# Patient Record
Sex: MALE
[De-identification: names, ages, dates, MRNs, and addresses within clinical notes are randomized per-mention and may not be internally consistent; named-entity substitution may affect disease eponyms.]

---

## 2017-09-11 ENCOUNTER — HOSPITAL ENCOUNTER (EMERGENCY)
Dept: HOSPITAL 31 - C.ER | Age: 22
LOS: 1 days | Discharge: HOME | End: 2017-09-12
Payer: COMMERCIAL

## 2017-09-11 DIAGNOSIS — B34.9: Primary | ICD-10-CM

## 2017-09-11 DIAGNOSIS — R42: ICD-10-CM

## 2017-09-11 DIAGNOSIS — R11.0: ICD-10-CM

## 2017-09-12 VITALS
RESPIRATION RATE: 16 BRPM | TEMPERATURE: 98 F | DIASTOLIC BLOOD PRESSURE: 71 MMHG | HEART RATE: 80 BPM | SYSTOLIC BLOOD PRESSURE: 107 MMHG | OXYGEN SATURATION: 95 %

## 2017-09-12 LAB
ALBUMIN/GLOB SERPL: 1.7 {RATIO} (ref 1–2.1)
ALP SERPL-CCNC: 79 U/L (ref 38–126)
ALT SERPL-CCNC: 23 U/L (ref 21–72)
AST SERPL-CCNC: 17 U/L (ref 17–59)
BASOPHILS # BLD AUTO: 0 K/UL (ref 0–0.2)
BASOPHILS NFR BLD: 0.5 % (ref 0–2)
BILIRUB SERPL-MCNC: 0.4 MG/DL (ref 0.2–1.3)
BILIRUB UR-MCNC: NEGATIVE MG/DL
BUN SERPL-MCNC: 10 MG/DL (ref 9–20)
CALCIUM SERPL-MCNC: 8.6 MG/DL (ref 8.6–10.4)
CHLORIDE SERPL-SCNC: 97 MMOL/L (ref 98–107)
CO2 SERPL-SCNC: 25 MMOL/L (ref 22–30)
EOSINOPHIL # BLD AUTO: 0.2 K/UL (ref 0–0.7)
EOSINOPHIL NFR BLD: 1.7 % (ref 0–4)
ERYTHROCYTE [DISTWIDTH] IN BLOOD BY AUTOMATED COUNT: 12.6 % (ref 11.5–14.5)
GLOBULIN SER-MCNC: 2.5 GM/DL (ref 2.2–3.9)
GLUCOSE SERPL-MCNC: 84 MG/DL (ref 75–110)
GLUCOSE UR STRIP-MCNC: NORMAL MG/DL
HCT VFR BLD CALC: 42.4 % (ref 35–51)
KETONES UR STRIP-MCNC: NEGATIVE MG/DL
LEUKOCYTE ESTERASE UR-ACNC: (no result) LEU/UL
LYMPHOCYTES # BLD AUTO: 1.7 K/UL (ref 1–4.3)
LYMPHOCYTES NFR BLD AUTO: 17.8 % (ref 20–40)
MCH RBC QN AUTO: 29.2 PG (ref 27–31)
MCHC RBC AUTO-ENTMCNC: 33.5 G/DL (ref 33–37)
MCV RBC AUTO: 87.1 FL (ref 80–94)
MONOCYTES # BLD: 0.6 K/UL (ref 0–0.8)
MONOCYTES NFR BLD: 6.4 % (ref 0–10)
NRBC BLD AUTO-RTO: 0 % (ref 0–2)
PH UR STRIP: 5 [PH] (ref 5–8)
PLATELET # BLD: 250 K/UL (ref 130–400)
PMV BLD AUTO: 7.9 FL (ref 7.2–11.7)
POTASSIUM SERPL-SCNC: 3.9 MMOL/L (ref 3.6–5.2)
PROT SERPL-MCNC: 6.7 G/DL (ref 6.3–8.3)
PROT UR STRIP-MCNC: NEGATIVE MG/DL
RBC # UR STRIP: NEGATIVE /UL
RBC #/AREA URNS HPF: 1 /HPF (ref 0–3)
SODIUM SERPL-SCNC: 137 MMOL/L (ref 132–148)
SP GR UR STRIP: 1.01 (ref 1–1.03)
UROBILINOGEN UR-MCNC: NORMAL MG/DL (ref 0.2–1)
WBC # BLD AUTO: 9.8 K/UL (ref 4.8–10.8)
WBC #/AREA URNS HPF: 2 /HPF (ref 0–5)

## 2017-09-12 NOTE — C.PDOC
History Of Present Illness





22 year old male who presents to the ER with a complaint of nausea, dizziness, 

and epigastric discomfort that began earlier today. Patient states he has been 

increasingly thirsty the past few days; he notes he has a FHx of diabetes. 

Denies dysuria or fever.


Chief Complaint (Nursing): GI Problem


History Per: Patient


History/Exam Limitations: no limitations


Onset/Duration Of Symptoms: Hrs


Current Symptoms Are (Timing): Still Present


Location Of Pain/Discomfort: Epigastric


Radiation Of Pain To:: None


Quality Of Discomfort: Unable To Describe


Associated Symptoms: Nausea.  denies: Fever, Chills, Vomiting, Urinary Symptoms


Exacerbating Factors: None


Alleviating Factors: None


Recent travel outside of the [place]: No





Past Medical History


Reviewed: Historical Data, Nursing Documentation, Vital Signs


Vital Signs: 


 Last Vital Signs











Temp  97.4 F L  17 23:30


 


Pulse  69   17 23:30


 


Resp  18   17 23:30


 


BP  134/84   17 23:30


 


Pulse Ox  100   17 01:57














- Medical History


PMH: Depression


Surgical History: No Surg Hx


Family History: States: Diabetes





- Social History


Hx Alcohol Use: No


Hx Substance Use: No





- Immunization History


Hx Tetanus Toxoid Vaccination: Yes


Hx Influenza Vaccination: No


Hx Pneumococcal Vaccination: No





Review Of Systems


Constitutional: Negative for: Fever, Chills


Gastrointestinal: Positive for: Nausea, Abdominal Pain.  Negative for: Vomiting


Neurological: Positive for: Dizziness





Physical Exam





- Physical Exam


Appears: Non-toxic, Other (Awake, Alert)


Skin: Normal Color, Warm, Dry


Head: Atraumatic, Normacephalic


Oral Mucosa: Moist


Chest: Symmetrical, No Tenderness


Cardiovascular: Rhythm Regular, No Murmur


Respiratory: Normal Breath Sounds, No Rales, No Rhonchi, No Wheezing


Gastrointestinal/Abdominal: Soft, No Tenderness


Neurological/Psych: Oriented x3, Normal Speech, Normal Cognition, Other (No 

focal deficits)





ED Course And Treatment





- Laboratory Results


Result Diagrams: 


 17 00:33





 17 00:33


ECG: Interpreted By Me, Viewed By Me


ECG Rhythm: Sinus Rhythm


ECG Interpretation: Normal, No Acute Changes


Interpretation Of ECG: NSR, normal tracings.


Rate From EC


O2 Sat by Pulse Oximetry: 100 (Room air)


Pulse Ox Interpretation: Normal


Progress Note: Blood work, EKG, and urinalysis ordered.





Disposition


Counseled Patient/Family Regarding: Diagnosis





- Disposition


Referrals: 


Tamica Enriquez [Primary Care Provider] - 


Disposition: HOME/ ROUTINE


Disposition Time: 01:56


Condition: STABLE


Prescriptions: 


Ondansetron ODT [Zofran ODT] 1 odt PO BID PRN #6 odt


 PRN Reason: Nausea/Vomiting


Instructions:  Acute Nausea and Vomiting (ED), Dizziness (ED)


Forms:  [facility] (English), Gen Discharge Inst Serbian


Print Language: German





- POA


Present On Arrival: None





- Clinical Impression


Clinical Impression: 


 Viral illness, Nausea, Dizziness








- Scribe Statement


The provider has reviewed the documentation as recorded by the Scribe





Myles Bingham





All medical record entries made by the Devorahibcarolyn were at my direction and 

personally dictated by me. I have reviewed the chart and agree that the record 

accurately reflects my personal performance of the history, physical exam, 

medical decision making, and the department course for this patient. I have 

also personally directed, reviewed, and agree with the discharge instructions 

and disposition.

## 2017-09-13 NOTE — CARD
--------------- APPROVED REPORT --------------





EKG Measurement

Heart [id number]

[id number]

[id number]

[id number]

[id number]



<Conclusion>

Normal sinus rhythm

Normal ECG

## 2018-12-28 ENCOUNTER — HOSPITAL ENCOUNTER (EMERGENCY)
Dept: HOSPITAL 31 - C.ER | Age: 23
Discharge: HOME | End: 2018-12-28
Payer: COMMERCIAL

## 2018-12-28 VITALS
RESPIRATION RATE: 18 BRPM | HEART RATE: 76 BPM | OXYGEN SATURATION: 100 % | DIASTOLIC BLOOD PRESSURE: 72 MMHG | TEMPERATURE: 98.9 F | SYSTOLIC BLOOD PRESSURE: 115 MMHG

## 2018-12-28 DIAGNOSIS — R10.9: Primary | ICD-10-CM

## 2018-12-28 LAB
ALBUMIN SERPL-MCNC: 4.9 G/DL (ref 3.5–5)
ALBUMIN/GLOB SERPL: 1.7 {RATIO} (ref 1–2.1)
ALT SERPL-CCNC: 14 U/L (ref 21–72)
AST SERPL-CCNC: 27 U/L (ref 17–59)
BASOPHILS # BLD AUTO: 0 K/UL (ref 0–0.2)
BASOPHILS NFR BLD: 0.2 % (ref 0–2)
BUN SERPL-MCNC: 14 MG/DL (ref 9–20)
CALCIUM SERPL-MCNC: 8.9 MG/DL (ref 8.6–10.4)
EOSINOPHIL # BLD AUTO: 0 K/UL (ref 0–0.7)
EOSINOPHIL NFR BLD: 0.3 % (ref 0–4)
EOSINOPHIL NFR BLD: 1 % (ref 0–4)
ERYTHROCYTE [DISTWIDTH] IN BLOOD BY AUTOMATED COUNT: 12.8 % (ref 11.5–14.5)
GFR NON-AFRICAN AMERICAN: > 60
HGB BLD-MCNC: 15.4 G/DL (ref 12–18)
LIPASE: 77 U/L (ref 23–300)
LYMPHOCYTE: 4 % (ref 20–40)
LYMPHOCYTES # BLD AUTO: 0.4 K/UL (ref 1–4.3)
LYMPHOCYTES NFR BLD AUTO: 3.8 % (ref 20–40)
MCH RBC QN AUTO: 28.9 PG (ref 27–31)
MCHC RBC AUTO-ENTMCNC: 33.1 G/DL (ref 33–37)
MCV RBC AUTO: 87 FL (ref 80–94)
MONOCYTE: 4 % (ref 0–10)
MONOCYTES # BLD: 0.6 K/UL (ref 0–0.8)
MONOCYTES NFR BLD: 6.5 % (ref 0–10)
NEUTROPHILS # BLD: 8.5 K/UL (ref 1.8–7)
NEUTROPHILS NFR BLD AUTO: 89.2 % (ref 50–75)
NEUTROPHILS NFR BLD AUTO: 91 % (ref 50–75)
NRBC BLD AUTO-RTO: 0 % (ref 0–2)
PLATELET # BLD EST: NORMAL 10*3/UL
PLATELET # BLD: 246 K/UL (ref 130–400)
PMV BLD AUTO: 7.7 FL (ref 7.2–11.7)
RBC # BLD AUTO: 5.34 MIL/UL (ref 4.4–5.9)
TOTAL CELLS COUNTED BLD: 100
WBC # BLD AUTO: 9.5 K/UL (ref 4.8–10.8)

## 2018-12-28 PROCEDURE — 74022 RADEX COMPL AQT ABD SERIES: CPT

## 2018-12-28 PROCEDURE — 99284 EMERGENCY DEPT VISIT MOD MDM: CPT

## 2018-12-28 PROCEDURE — 85025 COMPLETE CBC W/AUTO DIFF WBC: CPT

## 2018-12-28 PROCEDURE — 96374 THER/PROPH/DIAG INJ IV PUSH: CPT

## 2018-12-28 PROCEDURE — 83690 ASSAY OF LIPASE: CPT

## 2018-12-28 PROCEDURE — 80053 COMPREHEN METABOLIC PANEL: CPT

## 2018-12-28 PROCEDURE — 96375 TX/PRO/DX INJ NEW DRUG ADDON: CPT

## 2018-12-28 NOTE — C.PDOC
History Of Present Illness





24 y/o male comes in to ED with 1 day history of abdominal pain. Patient states 

it could be something he ate. Patient reports he had 15 episodes of vomiting 

today. Denies any fever, chills, cough, recent travel, or known sick contacts. 





Time Seen by Provider: 12/28/18 20:05


Chief Complaint (Nursing): Abdominal Pain


History Per: Patient


History/Exam Limitations: no limitations


Onset/Duration Of Symptoms: Days


Current Symptoms Are (Timing): Still Present





Past Medical History


Reviewed: Historical Data, Nursing Documentation, Vital Signs


Vital Signs: 





                                Last Vital Signs











Temp  100.3 F H  12/28/18 19:16


 


Pulse  90   12/28/18 19:16


 


Resp  16   12/28/18 19:16


 


BP  107/77   12/28/18 19:16


 


Pulse Ox  99   12/28/18 19:16














- Medical History


PMH: Depression


Family History: States: Diabetes





- Social History


Hx Alcohol Use: No


Hx Substance Use: No





- Immunization History


Hx Tetanus Toxoid Vaccination: Yes


Hx Influenza Vaccination: No


Hx Pneumococcal Vaccination: No





Review Of Systems


Except As Marked, All Systems Reviewed And Found Negative.


Gastrointestinal: Positive for: Abdominal Pain





Physical Exam





- Physical Exam


Appears: Non-toxic, No Acute Distress


Skin: Normal Color, Warm, Dry


Head: Atraumatic, Normacephalic


Eye(s): bilateral: Normal Inspection


Oral Mucosa: Moist


Neck: Supple


Chest: Symmetrical


Cardiovascular: Rhythm Regular, No Murmur


Respiratory: Normal Breath Sounds, No Rales, No Rhonchi, No Wheezing


Gastrointestinal/Abdominal: Tenderness (in epigastrium), No Guarding, No Rebound


Extremity: Bilateral: Atraumatic, Normal Color And Temperature, Normal ROM


Neurological/Psych: Oriented x3, Normal Speech





ED Course And Treatment





- Laboratory Results


Result Diagrams: 


                                 12/28/18 20:17





                                 12/28/18 20:17


O2 Sat by Pulse Oximetry: 99 (RA)


Pulse Ox Interpretation: Normal





Medical Decision Making


Medical Decision Making: 











Plan:


--Bloodwork


--Obstructive series


--Pepcid 20 mg IV


--IV fluids 1L


--Toradol 30 mg IV


--Zofran 4 mg IV





Preliminary XR reading: No free air. Nonspecific gas pattern. 





patient states improvement. Tolerated po's.  Repeat temp 98.9. Will discharge 

home to follow up with pmd within 2 days. 





Disposition





- Disposition


Referrals: 


Aidan Garcia [Staff Provider] - 


Disposition: HOME/ ROUTINE


Disposition Time: 21:23


Condition: STABLE


Additional Instructions: 


follow up with your doctor within 2 days


call to make an appointment


take medications as prescribed


return to ER if symptoms worsens or progress 


Prescriptions: 


Famotidine [Pepcid] 20 mg PO BID #20 tab


Ondansetron ODT [Zofran ODT] 4 mg PO TID PRN #12 odt


 PRN Reason: Nausea/Vomiting


Instructions:  Acute Abdomen (Belly Pain), Adult (DC)


Forms:  [facility] (English), General Discharge Instructions, Work Excuse





- Clinical Impression


Clinical Impression: 


 Abdominal pain








- Scribe Statement


The provider has reviewed the documentation as recorded by the Brett Burgos


Provider Attestation: 





All medical record entries made by the Brett were at my direction and 

personally dictated by me. I have reviewed the chart and agree that the record 

accurately reflects my personal performance of the history, physical exam, 

medical decision making, and the department course for this patient. I have also

personally directed, reviewed, and agree with the discharge instructions and 

disposition.

## 2018-12-29 NOTE — RAD
Date of service: 



12/28/2018



PROCEDURE:  Radiographs of the chest and abdomen (obstructive series)



HISTORY:

 abd pain 



COMPARISON:

No prior.



TECHNIQUE:

AP radiograph of the chest, with upright and supine radiographs of 

the abdomen.



FINDINGS:



CHEST:

Lungs: Clear.



Cardiovascular: Normal size heart. No pulmonary vascular congestion. 



 No aortic atherosclerotic calcification present



Pleura: No pleural fluid. No pneumothorax.



Other findings: None.



ABDOMEN AND PELVIS:

Bowel: There is moderate amount of stool.  Gas-filled small bowel 

loops with fluid levels are identified in the mid abdomen.  No 

evidence of mechanical obstruction.



Free air: None.



Bones:  Unremarkable.



Other findings: None.



IMPRESSION:

Constipation.  No evidence of mechanical bowel obstruction.